# Patient Record
Sex: FEMALE | URBAN - METROPOLITAN AREA
[De-identification: names, ages, dates, MRNs, and addresses within clinical notes are randomized per-mention and may not be internally consistent; named-entity substitution may affect disease eponyms.]

---

## 2024-09-28 PROBLEM — M25.562 BILATERAL CHRONIC KNEE PAIN: Status: ACTIVE | Noted: 2024-09-28

## 2024-09-28 PROBLEM — E66.01 MORBID OBESITY WITH BODY MASS INDEX (BMI) OF 45.0 TO 49.9 IN ADULT: Status: ACTIVE | Noted: 2022-08-05

## 2024-09-28 PROBLEM — G89.29 BILATERAL CHRONIC KNEE PAIN: Status: ACTIVE | Noted: 2024-09-28

## 2024-09-28 PROBLEM — E11.9 TYPE 2 DIABETES MELLITUS WITHOUT COMPLICATION, WITHOUT LONG-TERM CURRENT USE OF INSULIN (HCC): Status: ACTIVE | Noted: 2022-09-08

## 2024-09-28 PROBLEM — M25.561 BILATERAL CHRONIC KNEE PAIN: Status: ACTIVE | Noted: 2024-09-28

## 2024-09-28 PROBLEM — I10 ESSENTIAL HYPERTENSION: Status: ACTIVE | Noted: 2023-02-04

## 2024-09-28 PROBLEM — F51.04 PSYCHOPHYSIOLOGIC INSOMNIA: Status: ACTIVE | Noted: 2023-02-04

## 2024-09-28 NOTE — PROGRESS NOTES
Date of Encounter: 10/4/2024  Patient Name:Radha Weaver  Medical Record Number: [id number]    Chief Complaint   Patient presents with    New Patient     Bilateral knee pain       History of Present Illness:  Radha Weaver is a 49 y.o. female here for evaluation of her bilateral knee pain R > L.  Her current symptoms are described below and I reviewed them with her today.  She brought an MRI of her knees from [facility] with her on film showing moderate arthritic changes in her right knee and mild changes in her left.  She also endorses increasing pain in her lumbar region.  This is worse with walking activities.  Occasionally the pain will keep her awake at night.  No recent falls or direct trauma.  She does not use any adaptive devices to ambulate.  She has been taking meloxicam and feels like it is not helping.  She also tries over-the-counter Tylenol with limited pain relief.  Are using a live video  today to assist with the visit.    Pain Assessment  Location of Pain: Knee  Location Modifiers: Right, Left  Severity of Pain: 8  Quality of Pain: Other (Comment), Aching (PINCHING, pulling)  Duration of Pain: Persistent  Frequency of Pain: Constant  Date Pain First Started:  (10 years)  Aggravating Factors: Walking, Straightening  Limiting Behavior: Yes  Result of Injury: No  Work-Related Injury: No  Are there other pain locations you wish to document?: No    Past Medical History:   Diagnosis Date    HTN (hypertension)     Morbid obesity     Psychophysiologic insomnia     Type 2 diabetes mellitus without complication, without long-term current use of insulin (HCC)        No past surgical history on file.    Current Outpatient Medications   Medication Sig Dispense Refill    methylPREDNISolone (MEDROL, CRISPIN,) 4 MG tablet Take by mouth. 1 kit 0    Cyanocobalamin 50 MCG LOZG Take by mouth       No current facility-administered medications for this visit.      allergies, social and family histories were

## 2024-10-04 ENCOUNTER — OFFICE VISIT (OUTPATIENT)
Dept: ORTHOPEDIC SURGERY | Age: 49
End: 2024-10-04

## 2024-10-04 VITALS — WEIGHT: 293 LBS | BODY MASS INDEX: 43.4 KG/M2 | HEIGHT: 69 IN

## 2024-10-04 DIAGNOSIS — G89.29 BILATERAL CHRONIC KNEE PAIN: Primary | ICD-10-CM

## 2024-10-04 DIAGNOSIS — M54.16 LUMBAR RADICULAR PAIN: ICD-10-CM

## 2024-10-04 DIAGNOSIS — M25.561 BILATERAL CHRONIC KNEE PAIN: Primary | ICD-10-CM

## 2024-10-04 DIAGNOSIS — M25.562 BILATERAL CHRONIC KNEE PAIN: Primary | ICD-10-CM

## 2024-10-04 RX ORDER — METHYLPREDNISOLONE 4 MG
TABLET, DOSE PACK ORAL
Qty: 1 KIT | Refills: 0 | Status: SHIPPED | OUTPATIENT
Start: 2024-10-04

## 2024-10-17 ENCOUNTER — OFFICE VISIT (OUTPATIENT)
Age: 49
End: 2024-10-17

## 2024-10-17 VITALS — HEIGHT: 69 IN | BODY MASS INDEX: 43.4 KG/M2 | WEIGHT: 293 LBS

## 2024-10-17 DIAGNOSIS — G89.29 BILATERAL CHRONIC KNEE PAIN: ICD-10-CM

## 2024-10-17 DIAGNOSIS — M54.16 LUMBAR RADICULAR PAIN: Primary | ICD-10-CM

## 2024-10-17 DIAGNOSIS — M54.16 PAIN, RADICULAR, LUMBAR: ICD-10-CM

## 2024-10-17 DIAGNOSIS — M25.562 BILATERAL CHRONIC KNEE PAIN: ICD-10-CM

## 2024-10-17 DIAGNOSIS — M25.561 BILATERAL CHRONIC KNEE PAIN: ICD-10-CM

## 2024-10-17 RX ORDER — MELOXICAM 15 MG/1
15 TABLET ORAL DAILY
Qty: 30 TABLET | Refills: 1 | Status: SHIPPED | OUTPATIENT
Start: 2024-10-17

## 2024-10-17 RX ORDER — MELOXICAM 15 MG/1
15 TABLET ORAL DAILY
Qty: 30 TABLET | Refills: 0 | Status: CANCELLED | OUTPATIENT
Start: 2024-10-17

## 2024-10-24 ENCOUNTER — TELEPHONE (OUTPATIENT)
Age: 49
End: 2024-10-24

## 2024-10-24 NOTE — TELEPHONE ENCOUNTER
Called language service to assist with letting patient know that her MRI is approved.       Approval for MRI: of Lumbar   Approval Letter in Media   Approved Facility [facility]   Approval Dates: 10.23.24 to 12.23.24   Auth #: [id number]

## 2024-10-27 PROBLEM — M54.16 LUMBAR RADICULAR PAIN: Status: ACTIVE | Noted: 2024-10-27

## 2024-11-01 ENCOUNTER — HOSPITAL ENCOUNTER (OUTPATIENT)
Dept: MRI IMAGING | Age: 49
Discharge: HOME | End: 2024-11-01
Attending: ORTHOPAEDIC SURGERY
Payer: MEDICAID

## 2024-11-01 DIAGNOSIS — M54.16 LUMBAR RADICULAR PAIN: ICD-10-CM

## 2024-11-01 PROCEDURE — 72148 MRI LUMBAR SPINE W/O DYE: CPT

## 2024-11-05 ENCOUNTER — TELEPHONE (OUTPATIENT)
Age: 49
End: 2024-11-05

## 2024-11-05 DIAGNOSIS — M54.16 LUMBAR RADICULAR PAIN: Primary | ICD-10-CM

## 2024-11-05 NOTE — TELEPHONE ENCOUNTER
Results show some bilateral narrowing of the space for her nerves that can cause pain to radiate down the sides of her legs to knees.  I would recommend a trial of JUANPABLO and we can get her scheduled for an injection with [facility].

## 2024-11-14 ENCOUNTER — TELEPHONE (OUTPATIENT)
Age: 49
End: 2024-11-14

## 2024-11-14 NOTE — TELEPHONE ENCOUNTER
IR called and stated that they are not in network for her JUANPABLO.  Please advice.   13-Nov-2022 00:04

## 2024-11-21 ENCOUNTER — TELEPHONE (OUTPATIENT)
Age: 49
End: 2024-11-21

## 2024-11-21 NOTE — TELEPHONE ENCOUNTER
PATIENT SAID SHE  NEEDS A REFERRAL SENT TO A DIFFERENT DOCTOR FOR HER BACK PAIN     PLEASE CALL [phone]

## 2024-11-21 NOTE — TELEPHONE ENCOUNTER
Called patient spoke with her about how Jostin requires a referral from her primary care she will contact her PCP to get the referral going.